# Patient Record
Sex: MALE | Race: WHITE | Employment: UNEMPLOYED | RURAL
[De-identification: names, ages, dates, MRNs, and addresses within clinical notes are randomized per-mention and may not be internally consistent; named-entity substitution may affect disease eponyms.]

---
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
## 2021-08-18 ENCOUNTER — HOSPITAL ENCOUNTER (EMERGENCY)
Facility: HOSPITAL | Age: 21
Discharge: HOME | End: 2021-08-18
Attending: HOSPITALIST
Payer: MEDICAID

## 2021-08-18 VITALS
WEIGHT: 210 LBS | DIASTOLIC BLOOD PRESSURE: 84 MMHG | TEMPERATURE: 99.2 F | RESPIRATION RATE: 16 BRPM | HEIGHT: 70 IN | OXYGEN SATURATION: 98 % | HEART RATE: 90 BPM | BODY MASS INDEX: 30.06 KG/M2 | SYSTOLIC BLOOD PRESSURE: 134 MMHG

## 2021-08-18 DIAGNOSIS — U07.1 COVID-19: Primary | ICD-10-CM

## 2021-08-18 LAB
RAPID INFLUENZA  B AGN: NEGATIVE
RAPID INFLUENZA A AGN: NEGATIVE
S PYO AG THROAT QL: NEGATIVE
SARS-COV-2, NAAT: DETECTED

## 2021-08-18 PROCEDURE — 87880 STREP A ASSAY W/OPTIC: CPT

## 2021-08-18 PROCEDURE — 99283 EMERGENCY DEPT VISIT LOW MDM: CPT

## 2021-08-18 PROCEDURE — 6370000000 HC RX 637 (ALT 250 FOR IP): Performed by: HOSPITALIST

## 2021-08-18 PROCEDURE — 87635 SARS-COV-2 COVID-19 AMP PRB: CPT

## 2021-08-18 PROCEDURE — 87804 INFLUENZA ASSAY W/OPTIC: CPT

## 2021-08-18 RX ORDER — ALBUTEROL SULFATE 90 UG/1
2 AEROSOL, METERED RESPIRATORY (INHALATION) EVERY 4 HOURS PRN
Qty: 1 INHALER | Refills: 0 | Status: SHIPPED | OUTPATIENT
Start: 2021-08-18 | End: 2021-09-17

## 2021-08-18 RX ORDER — ACETAMINOPHEN 325 MG/1
650 TABLET ORAL ONCE
Status: COMPLETED | OUTPATIENT
Start: 2021-08-18 | End: 2021-08-18

## 2021-08-18 RX ADMIN — ACETAMINOPHEN 650 MG: 325 TABLET, FILM COATED ORAL at 16:36

## 2021-08-18 ASSESSMENT — PAIN DESCRIPTION - LOCATION: LOCATION: HEAD

## 2021-08-18 ASSESSMENT — PAIN SCALES - GENERAL: PAINLEVEL_OUTOF10: 3

## 2021-08-18 NOTE — ED NOTES
Pt co feeling hot and cold at the same time, pt states he has a headache and vomited one time yesterday.       Aidee Hernandez RN  08/18/21 9966

## 2021-08-18 NOTE — ED PROVIDER NOTES
[facility] ENCOUNTER      Pt Name: Jonette Baumgarten  MRN: [id number]  YOB: 2000  Date of evaluation: 8/18/2021  Provider: Soo Webb [facility]       Chief Complaint   Patient presents with    Chills    Nausea    Headache         HISTORY OF PRESENT ILLNESS  (Location/Symptom, Timing/Onset, Context/Setting, Quality, Duration, Modifying Factors, Severity.)   Jonette Baumgarten is a 24 y.o. male who presents to the emergency department for chills nausea and headache. Patient states his symptoms started yesterday. Advised that he felt hot all last night he could not sleep for it but advised that he did not have anything to take at home for the a \"fever\" that he was feeling that he had. States he did not have the ability to check for temperature either. Patient states that today he actually was able to take a nap for couple of hours but was still having sensations of being hot and cold. States he had some nausea but denies any vomiting or diarrhea. States has had intermittent headaches on and off but only rates his headache as a 3 out of 10. Patient unaware of any sick contacts that he may have. Patient concern for COVID-19. He is unvaccinated. Denies any body aches out of ordinary. Denies any numbness tingling weakness of the extremities out of ordinary. Nursing notes were reviewed.     REVIEW OFSYSTEMS    (2-9 systems for level 4, 10 or more for level 5)   ROS:  General:  + fevers, + chills, no weakness  Cardiovascular:  No chest pain, no palpitations  Respiratory:  No shortness of breath, no cough, no wheezing  Gastrointestinal:  No pain, +nausea, no vomiting, no diarrhea  Musculoskeletal:  No muscle pain, no joint pain  Skin:  No rash, no easy bruising  Neurologic:  No speech problems, + headache, no extremity weakness  Psychiatric:  No anxiety  Genitourinary:  No dysuria, no hematuria    Except as noted above the remainder of the review of systems was reviewed and negative. PAST MEDICAL HISTORY   History reviewed. No pertinent past medical history. SURGICAL HISTORY     History reviewed. No pertinent surgical history. CURRENT MEDICATIONS       Previous Medications    No medications on file       ALLERGIES     Patient has no known allergies. FAMILY HISTORY     History reviewed. No pertinent family history. SOCIAL HISTORY       Social History     Socioeconomic History    Marital status: Single     Spouse name: None    Number of children: None    Years of education: None    Highest education level: None   Occupational History    None   Tobacco Use    Smoking status: Current Every Day Smoker   Substance and Sexual Activity    Alcohol use: None    Drug use: None    Sexual activity: None   Other Topics Concern    None   Social History Narrative    None     Social Determinants of Health     Financial Resource Strain:     Difficulty of Paying Living Expenses:    Food Insecurity:     Worried About Running Out of Food in the Last Year:     Ran Out of Food in the Last Year:    Transportation Needs:     Lack of Transportation (Medical):      Lack of Transportation (Non-Medical):    Physical Activity:     Days of Exercise per Week:     Minutes of Exercise per Session:    Stress:     Feeling of Stress :    Social Connections:     Frequency of Communication with Friends and Family:     Frequency of Social Gatherings with Friends and Family:     Attends [employer] Services:     Active Member of Clubs or Organizations:     Attends Club or Organization Meetings:     Marital Status:    Intimate Partner Violence:     Fear of Current or Ex-Partner:     Emotionally Abused:     Physically Abused:     Sexually Abused:          PHYSICAL EXAM    (up to 7 for level 4, 8 or more for level 5)     ED Triage Vitals [08/18/21 1542]   BP Temp Temp Source Pulse Resp SpO2 Height Weight   134/84 99.2 °F (37.3 Nasopharyngeal Swab   Result Value Ref Range    SARS-CoV-2, NAAT DETECTED (AA) Not Detected        All other labs were within normal range or not returned as of this dictation. EMERGENCY DEPARTMENT COURSE and DIFFERENTIAL DIAGNOSIS/MDM:   Vitals:    Vitals:    08/18/21 1542   BP: 134/84   Pulse: 90   Resp: 16   Temp: 99.2 °F (37.3 °C)   TempSrc: Oral   SpO2: 98%   Weight: 210 lb (95.3 kg)   Height: 5' 10\" (1.778 m)       MEDICATIONS ADMINISTERED IN ED:  Medications   acetaminophen (TYLENOL) tablet 650 mg (650 mg Oral Given 8/18/21 1636)       After initial evaluation and examination I did have a conversation with the patient about the upcoming plan, treatment possible disposition which they were agreeable to the time of dictation. Patient advised that we would perform rapid Covid, strep and influenza swab. Patient was offered a Tylenol here which she was agreeable to take for his headache. We will give him 650 mg. Patient resting comfortably stretcher no acute distress nontoxic-appearing. Patient's pulse ox is normal on room air he is not tachypneic or tachycardic. Patient's final disposition will be determined once his diagnostic studies been performed reviewed. Rapid Covid screen was positive    Influenza swab was negative    Strep screen was negative    Patient's diagnostic studies were discussed with him and he does state his understanding. Patient advised that that he will need to quarantine for 10 days from today's date we will write an excuse for this. We will also write him for an albuterol inhaler 1 to 2 puffs every 4-6 hours as needed for any shortness of breath or cough he may develop. At this time he does not really meet the criteria for coverage with atypical antibiotic coverage. He does not even really have a cough at this time the albuterol inhaler is probably actually more than what he needs at this time unless his symptoms worsens.   Patient advised since he does not have a primary care provider we would place a no PCP referral order at time of discharge which she is agreeable to. Patient advised someone from the hospital will contact him in the next 5-7 business days to help set him up with a primary care provider that is local to him and through the hospital system. Otherwise he was given instructions if his symptoms worsens or new symptoms arise he should return back to the emergency department for further evaluation work-up. CONSULTS:  None    PROCEDURES:  Procedures    CRITICAL CARE TIME    Total Critical Care time was 0 minutes, excluding separately reportable procedures. There was a high probability of clinically significant/life threatening deterioration in the patient's condition which required my urgent intervention. FINAL IMPRESSION      1. COVID-19          DISPOSITION/PLAN   DISPOSITION Decision To Discharge 08/18/2021 04:44:21 PM      PATIENT REFERRED TO:  [facility]  [facility] [address] [facility]  [phone]    As needed, If symptoms worsen      DISCHARGE MEDICATIONS:  New Prescriptions    ALBUTEROL SULFATE HFA (PROVENTIL HFA) 108 (90 BASE) MCG/ACT INHALER    Inhale 2 puffs into the lungs every 4 hours as needed for Wheezing or Shortness of Breath With spacer. Thanks. Comment: Please note this report has been produced using speech recognition software and may contain errorsrelated to that system including errors in grammar, punctuation, and spelling, as well as words and phrases that may be inappropriate. If there are any questions or concerns please feel free to contact the dictating providerfor clarification.     Soo Webb DO  Attending Emergency Physician              Soo Webb DO  08/18/21 0854

## 2024-04-27 ENCOUNTER — HOSPITAL ENCOUNTER (EMERGENCY)
Facility: HOSPITAL | Age: 24
Discharge: HOME | End: 2024-04-27
Attending: EMERGENCY MEDICINE
Payer: COMMERCIAL

## 2024-04-27 VITALS
HEIGHT: 70 IN | DIASTOLIC BLOOD PRESSURE: 90 MMHG | SYSTOLIC BLOOD PRESSURE: 139 MMHG | OXYGEN SATURATION: 99 % | BODY MASS INDEX: 31.5 KG/M2 | HEART RATE: 83 BPM | WEIGHT: 220 LBS | RESPIRATION RATE: 16 BRPM | TEMPERATURE: 98 F

## 2024-04-27 DIAGNOSIS — S61.239A PUNCTURE WOUND OF FINGER OF LEFT HAND, INITIAL ENCOUNTER: Primary | ICD-10-CM

## 2024-04-27 PROCEDURE — 99283 EMERGENCY DEPT VISIT LOW MDM: CPT

## 2024-04-27 PROCEDURE — 6370000000 HC RX 637 (ALT 250 FOR IP): Performed by: EMERGENCY MEDICINE

## 2024-04-27 PROCEDURE — 90471 IMMUNIZATION ADMIN: CPT | Performed by: EMERGENCY MEDICINE

## 2024-04-27 PROCEDURE — 6360000002 HC RX W HCPCS: Performed by: EMERGENCY MEDICINE

## 2024-04-27 PROCEDURE — 90715 TDAP VACCINE 7 YRS/> IM: CPT | Performed by: EMERGENCY MEDICINE

## 2024-04-27 RX ORDER — IBUPROFEN 200 MG
CAPSULE ORAL ONCE
Status: COMPLETED | OUTPATIENT
Start: 2024-04-27 | End: 2024-04-27

## 2024-04-27 RX ORDER — CEPHALEXIN 500 MG/1
1000 CAPSULE ORAL ONCE
Status: COMPLETED | OUTPATIENT
Start: 2024-04-27 | End: 2024-04-27

## 2024-04-27 RX ORDER — CEPHALEXIN 500 MG/1
500 CAPSULE ORAL 4 TIMES DAILY
Qty: 28 CAPSULE | Refills: 0 | Status: SHIPPED | OUTPATIENT
Start: 2024-04-27 | End: 2024-05-04

## 2024-04-27 RX ADMIN — BACITRACIN ZINC, NEOMYCIN, POLYMYXIN B 1 G: 400; 3.5; 5 OINTMENT TOPICAL at 19:46

## 2024-04-27 RX ADMIN — CEPHALEXIN 1000 MG: 500 CAPSULE ORAL at 19:46

## 2024-04-27 RX ADMIN — TETANUS TOXOID, REDUCED DIPHTHERIA TOXOID AND ACELLULAR PERTUSSIS VACCINE, ADSORBED 0.5 ML: 5; 2.5; 8; 8; 2.5 SUSPENSION INTRAMUSCULAR at 19:46

## 2024-04-27 ASSESSMENT — PAIN - FUNCTIONAL ASSESSMENT
PAIN_FUNCTIONAL_ASSESSMENT: 0-10
PAIN_FUNCTIONAL_ASSESSMENT: 0-10

## 2024-04-27 ASSESSMENT — PAIN DESCRIPTION - ORIENTATION
ORIENTATION: LEFT
ORIENTATION: LEFT

## 2024-04-27 ASSESSMENT — PAIN SCALES - GENERAL
PAINLEVEL_OUTOF10: 3
PAINLEVEL_OUTOF10: 3

## 2024-04-27 ASSESSMENT — PAIN DESCRIPTION - DESCRIPTORS
DESCRIPTORS: THROBBING
DESCRIPTORS: THROBBING

## 2024-04-27 ASSESSMENT — PAIN DESCRIPTION - PAIN TYPE
TYPE: ACUTE PAIN
TYPE: ACUTE PAIN

## 2024-04-27 ASSESSMENT — PAIN DESCRIPTION - LOCATION
LOCATION: FINGER (COMMENT WHICH ONE)
LOCATION: FINGER (COMMENT WHICH ONE)

## 2024-04-27 ASSESSMENT — LIFESTYLE VARIABLES: HOW OFTEN DO YOU HAVE A DRINK CONTAINING ALCOHOL: NEVER

## 2024-04-27 NOTE — DISCHARGE INSTRUCTIONS
Keep the finger wound clean and dry.  Change the dressing at least 2-3 times a day.  Apply over-the-counter bacitracin or Neosporin ointment to the wound after cleaning.  Take Tylenol Aleve or ibuprofen for pain as needed.  Take the antibiotic as prescribed.  Follow-up with your doctor for medical and pain management.  Return to the ER earlier for reassessment if your condition worsens.

## 2024-04-27 NOTE — ED PROVIDER NOTES
[facility]  EMERGENCY DEPARTMENT ENCOUNTER        Pt Name: Alonso Kelly  MRN: [id number]  Birthdate 2000  Date of evaluation: 4/27/2024  Provider: Basil Schaffer MD  PCP: No primary care provider on file.  Note Started: 7:26 PM EDT 4/27/24    CHIEF COMPLAINT       No chief complaint on file.  Puncture wound to left distal middle finger on the palmar side.  Small piece of epithelium at approximately 4 to 5 mm in diameter is missing.  An associated scratch of the same palmar skin on the finger is noted.    HISTORY OF PRESENT ILLNESS: 1 or more Elements   History obtained from the patient.  He is unaccompanied.    History from : Patient patient working with a drill bit.  He lost control and the end of the drill bit went into his finger causing a puncture wound.  No foreign bodies noted.  With a puncture wound to his left distal middle finger    Limitations to acquisition of history: None    Alonso Kelly is a 24 y.o. male who presents to the ER with a puncture wound of his left distal middle finger.    Nursing Notes were all reviewed and agreed with or any disagreements were addressed in the HPI.    REVIEW OF SYSTEMS :      Review of systems    All systems reviewed and negative except as in HPI/MDM    PAST MEDICAL HISTORY   History reviewed. No pertinent past medical history.    SURGICAL HISTORY     Past Surgical History:   Procedure Laterality Date    APPENDECTOMY      TONSILLECTOMY         CURRENTMEDICATIONS       Previous Medications    ALBUTEROL SULFATE HFA (PROVENTIL HFA) 108 (90 BASE) MCG/ACT INHALER    Inhale 2 puffs into the lungs every 4 hours as needed for Wheezing or Shortness of Breath With spacer. Thanks.       ALLERGIES     Patient has no known allergies.    FAMILYHISTORY     History reviewed. No pertinent family history.     SOCIAL HISTORY       Social History     Tobacco Use    Smoking status: Every Day   Substance Use Topics    Alcohol use: Yes

## 2024-04-27 NOTE — ED TRIAGE NOTES
Pt states that about 3 or 4 today he punctured his left middle finger with a drill.  He states his pain is 3/10.  He wasn't going to come today but his family advised that he would probably need a tetanus shot   Went to ED today at 0830 for confusion and +Covid infection  Stroke workup negative. Per notes:  Pt is on a baby ASA, no other new recommendations per neurology. His vitals are stable, hospitalization not recommended.   He ambulated well, his wife was advised that if confusions worsens such that he is not safe at home or if he develops no concerning symptoms to return to the ER   Per Neuro consult notes:  Discussion/plan:     Based on my clinical assessment I do not believe the patient had a stroke, but rather mild encephalopathy likely secondary to being COVID positive.  He is already taking aspirin 81 mg p.o. q.day and will continue at this dose.  Patient and his wife may benefit from some home health care since they are both COVID positive, but I do not think admission to the hospital for observation is necessary at this time.    RN spoke with wife.  Wife states confusion has gotten worse since leaving ED a few hours ago. Wife states patient didnt know month or year and didn't recognize her. He called her \"Aram Dill son in law\"  She states he also hasn't been following direction.  She states she told him to sit down and he went in a different directions.    Ate lunch today just fine  No HA or vision problems  No unilateral weakness, but is slow walking. Was able to get out of car and walk into house.  covid + coughing started yesterday  No problems breathing  Wife states that no antivirals were offered.    Care advice given. Wife hesitant to go back to ED. RN will speak with Dr Huertas    RN spoke with Dr Huertas and after reviewing chart, did agree patient should go back to ED.  Wife update and in agreement.

## 2024-04-28 NOTE — ED NOTES
Reviewed discharge plan with Alonso Kelly.  Encouraged him to f/u with his pcp and he understood.  NAD noted on discharge, gait steady.  Reviewed discharge prescription for:     Current Discharge Medication List        START taking these medications    Details   cephALEXin (KEFLEX) 500 MG capsule Take 1 capsule by mouth 4 times daily for 7 days  Qty: 28 capsule, Refills: 0             Alonso states understanding of how and when to take medications.      Electronically signed by Cristela Young RN on 4/27/2024 at 8:00 PM